# Patient Record
(demographics unavailable — no encounter records)

---

## 2024-10-17 NOTE — PHYSICAL EXAM
[Normal] : cervix [Cystocele] : a cystocele [de-identified] : vaginal laceration healing  [FreeTextEntry4] : 2nd degree cystocele /1st degree rectocele  [FreeTextEntry5] : slight prolapse

## 2024-10-17 NOTE — CHIEF COMPLAINT
[Urgent Visit] : Urgent Visit [FreeTextEntry1] : Patient delivered on week ago and feels pressure in vaginal area At delivery cervix was noted to be at introitus

## 2024-11-06 NOTE — HISTORY OF PRESENT ILLNESS
[] : delivered by vaginal delivery [Breastfeeding] : currently nursing [None] : The patient is currently asymptomatic [S/Sx PP Depression] : no signs/symptoms of postpartum depression [Suicidal Ideation] : no suicidal ideation [FreeTextEntry8] : Patient doing well. Cystocele is better  [FreeTextEntry1] : Patient with cystocele It is feeling better  She will give it another month and then if she is still bothered she will do pelvic floor therapy